# Patient Record
Sex: MALE | Race: WHITE | NOT HISPANIC OR LATINO | Employment: FULL TIME | URBAN - METROPOLITAN AREA
[De-identification: names, ages, dates, MRNs, and addresses within clinical notes are randomized per-mention and may not be internally consistent; named-entity substitution may affect disease eponyms.]

---

## 2021-05-14 ENCOUNTER — VIRTUAL VISIT (OUTPATIENT)
Dept: PHARMACY | Facility: CLINIC | Age: 31
End: 2021-05-14
Payer: COMMERCIAL

## 2021-05-14 DIAGNOSIS — K50.019 CROHN'S DISEASE OF SMALL INTESTINE WITH COMPLICATION (H): Primary | ICD-10-CM

## 2021-05-14 PROCEDURE — 99605 MTMS BY PHARM NP 15 MIN: CPT | Performed by: PHARMACIST

## 2021-05-14 RX ORDER — ACETAMINOPHEN 500 MG
500-1000 TABLET ORAL EVERY 6 HOURS PRN
COMMUNITY

## 2021-05-14 NOTE — LETTER
[address] [id number] [phone]      May 14, 2021    Gurinder Zaman                                                                                                                     [address] [facility] [address]      Dear Gurinder,    Recommendations from today's MTM visit:                                                    MTM (medication therapy management) is a service provided by a clinical pharmacist designed to help you get the most of out of your medicines.  Today we reviewed what your medicines are for, how to know if they are working, that your medicines are safe and how to make your medicine regimen as easy as possible.      1. For future years make sure to determine if your insurance options have a COPAY ACCUMULATOR program in place.  These programs essentially prevent the copay assistance program from counting towards your out of pocket costs. Especially if you do not have a HSA (health savings account) this can become an expensive issue.    2. For the 2021 year here are the Tier 5 (Specialty Preferred Brands) indicated for Crohn's Disease. There are several others that are Tier 6 (non-preferred - like Entyvio).  All of these likely require prior authorizations but the below would require less hoops to jump through typically.     Avsola (infliximab-axxq) - Remicade biosimilar  Cimzia (certolizumab pegol)   Humira (adalimumab)   Inflectra (infliximab-dyyb) - Remicade biosimilar  Renflexis (infliximab-abda) - Remicade biosimilar    Follow-up: as needed    It was great to speak with you today.  I value your experience and would be very thankful for your time with providing feedback on our clinic survey. You may receive a survey via email or text message in the next few days.     To schedule another MTM appointment, please call the clinic directly or you may call the MTM scheduling line at [phone] or toll-free at [phone].     My Clinical  Pharmacist's contact information:                                                      Please feel free to contact me with any questions or concerns you have.      Kiet Alvarenga, PharmD, [facility]  Medication Therapy Management Pharmacist  Pager: [phone]

## 2021-05-14 NOTE — PATIENT INSTRUCTIONS
Recommendations from today's MTM visit:                                                    MTM (medication therapy management) is a service provided by a clinical pharmacist designed to help you get the most of out of your medicines.   Today we reviewed what your medicines are for, how to know if they are working, that your medicines are safe and how to make your medicine regimen as easy as possible.      1. For future years make sure to determine if your insurance options have a COPAY ACCUMULATOR program in place.  These programs essentially prevent the copay assistance program from counting towards your out of pocket costs. Especially if you do not have a HSA (health savings account) this can become an expensive issue.    2. For the 2021 year here are the Tier 5 (Specialty Preferred Brands) indicated for Crohn's Disease. There are several others that are Tier 6 (non-preferred - like Entyvio).  All of these likely require prior authorizations but the below would require less hoops to jump through typically.     Avsola (infliximab-axxq) - Remicade biosimilar  Cimzia (certolizumab pegol)   Humira (adalimumab)   Inflectra (infliximab-dyyb) - Remicade biosimilar  Renflexis (infliximab-abda) - Remicade biosimilar    Follow-up: as needed    It was great to speak with you today.  I value your experience and would be very thankful for your time with providing feedback on our clinic survey. You may receive a survey via email or text message in the next few days.     To schedule another MTM appointment, please call the clinic directly or you may call the MTM scheduling line at [phone] or toll-free at [phone].     My Clinical Pharmacist's contact information:                                                      Please feel free to contact me with any questions or concerns you have.      Kiet Alvarenga, Ariela, BCACP  Medication Therapy Management Pharmacist  Pager: [phone]      
normal rate, regular rhythm, and no murmur.

## 2021-05-14 NOTE — PROGRESS NOTES
Medication Therapy Management (MTM) Encounter    ASSESSMENT:                            Medication Adherence/Access: No issues identified    Crohn's Disease: Somewhat stable. Plan in place with gastroenterology. May benefit from summary of current Clear[employer] tier 5 (Preferred Specialty Brands) for Crohn's Disease.    PLAN:                            1. Letter sent with preferred specialty options in case change is required.     Follow-up: as needed    SUBJECTIVE/OBJECTIVE:                          Gurinder Zaman is a 30 year old male called for an initial visit. He was referred to me from his [facility] insurace RN case manager.      Reason for visit: Billing/Medication Cost concern.    Allergies/ADRs: None  Tobacco: He has no history on file for tobacco.  Alcohol: Less than 1 beverages / week  Activity: mainly through work  Past Medical History: Reviewed in chart      Medication Adherence/Access: cost concerns - states [facility] was more efficient with getting billing out this year and had to pay vs his copayment assistance helping with deductible this year.     Crohn's Disease: Patient is currently receiving Entyvio infusion - 300 mg every 8 weeks through [facility].  He continued to have worsening disease and his insurance would not pay for higher dose - had to go to [facility]. Patient states that he had an issue with billing this year when his [facility] clinic visits were submitted for billing prior to his infusion.  He is part of the Entyvio[employer] program and this has paid his deductible in the past. His new employer eliminated HSA program so he has a $4200 deductible.  He ended up with a sizeable bill from [facility] and is now on a payment plan.  With his Crohn's he does not have many symptomatic flares, but does have intestinal involvement.  He has been previously treated with Entocort, Remicade, and azathioprine.  [facility] provider has considered Stelara per patient.  Denies any current concerns with side effects or  issues. He is not sure much can be done about the past.     Today's Vitals: There were no vitals taken for this visit.  - no vitals on file with [facility]    ----------------      I spent 10 minutes with this patient today. A copy of the visit note was provided to the patient's primary care provider.    The patient was mailed a summary of these recommendations.     Kiet Alvarenga, MameD, [facility]  Medication Therapy Management Pharmacist  Pager: [phone]    Telemedicine Visit Details  Type of service:  Telephone visit  Start Time: 3:00 PM  End Time: 3:10 PM  Originating Location (patient location): [facility]  Distant Location (provider location):  [facility]      Medication Therapy Recommendations  No medication therapy recommendations to display